# Patient Record
Sex: FEMALE | Race: WHITE | NOT HISPANIC OR LATINO | URBAN - METROPOLITAN AREA
[De-identification: names, ages, dates, MRNs, and addresses within clinical notes are randomized per-mention and may not be internally consistent; named-entity substitution may affect disease eponyms.]

---

## 2017-03-10 ENCOUNTER — RECORDS - HEALTHEAST (OUTPATIENT)
Dept: LAB | Facility: CLINIC | Age: 22
End: 2017-03-10

## 2017-03-10 LAB — HIV 1+2 AB+HIV1 P24 AG SERPL QL IA: NEGATIVE

## 2017-03-13 LAB — SYPHILIS RPR SCREEN - HISTORICAL: NORMAL

## 2018-02-27 ENCOUNTER — RECORDS - HEALTHEAST (OUTPATIENT)
Dept: LAB | Facility: CLINIC | Age: 23
End: 2018-02-27

## 2018-02-27 LAB
BASOPHILS # BLD AUTO: 0 THOU/UL (ref 0–0.2)
BASOPHILS NFR BLD AUTO: 1 % (ref 0–2)
EOSINOPHIL # BLD AUTO: 0.1 THOU/UL (ref 0–0.4)
EOSINOPHIL NFR BLD AUTO: 2 % (ref 0–6)
ERYTHROCYTE [DISTWIDTH] IN BLOOD BY AUTOMATED COUNT: 12.5 % (ref 11–14.5)
HCT VFR BLD AUTO: 34.9 % (ref 35–47)
HGB BLD-MCNC: 11.6 G/DL (ref 12–16)
HIV 1+2 AB+HIV1 P24 AG SERPL QL IA: NEGATIVE
LYMPHOCYTES # BLD AUTO: 2.1 THOU/UL (ref 0.8–4.4)
LYMPHOCYTES NFR BLD AUTO: 33 % (ref 20–40)
MCH RBC QN AUTO: 29.6 PG (ref 27–34)
MCHC RBC AUTO-ENTMCNC: 33.2 G/DL (ref 32–36)
MCV RBC AUTO: 89 FL (ref 80–100)
MONOCYTES # BLD AUTO: 0.4 THOU/UL (ref 0–0.9)
MONOCYTES NFR BLD AUTO: 7 % (ref 2–10)
NEUTROPHILS # BLD AUTO: 3.7 THOU/UL (ref 2–7.7)
NEUTROPHILS NFR BLD AUTO: 58 % (ref 50–70)
PLATELET # BLD AUTO: 274 THOU/UL (ref 140–440)
PMV BLD AUTO: 9.9 FL (ref 8.5–12.5)
RBC # BLD AUTO: 3.92 MILL/UL (ref 3.8–5.4)
WBC: 6.3 THOU/UL (ref 4–11)

## 2018-02-28 LAB
ABO/RH(D): NORMAL
ABORH REPEAT: NORMAL
ANTIBODY SCREEN: NEGATIVE
HBV SURFACE AG SERPL QL IA: NEGATIVE
RUBV IGG SERPL QL IA: POSITIVE
SYPHILIS RPR SCREEN - HISTORICAL: NORMAL

## 2018-03-13 ENCOUNTER — RECORDS - HEALTHEAST (OUTPATIENT)
Dept: LAB | Facility: CLINIC | Age: 23
End: 2018-03-13

## 2018-03-14 LAB
C TRACH DNA SPEC QL PROBE+SIG AMP: NEGATIVE
N GONORRHOEA DNA SPEC QL NAA+PROBE: NEGATIVE

## 2018-03-15 LAB

## 2018-04-10 ENCOUNTER — RECORDS - HEALTHEAST (OUTPATIENT)
Dept: LAB | Facility: CLINIC | Age: 23
End: 2018-04-10

## 2018-04-12 LAB
AFP MOM: 1.38 MOM
ALPHA FETO PROTEIN: 43.9 NG/ML
CALCULATED AGE AT EDD: NORMAL
COLLECTION DATE: NORMAL
CURRENT CIGARETTE SMOKING STATUS: NORMAL
DOWN SYNDROME MATERNAL AGE RISK: NORMAL
DOWN SYNDROME SCREEN RISK ESTIMATE: NORMAL
EDD BY LMP: NORMAL
GA ON COLLECTION BY DATES: NORMAL WK,D
GA USED IN RISK ESTIMATE: NORMAL
GENERAL TEST INFORMATION: NORMAL
HCG, TOTAL MOM: 1.26 MOM
HCG, TOTAL: 40.9 IU/ML
INHIBIN MOM: 1.03 MOM
INHIBIN: 152 PG/ML
INITIAL OR REPEAT TESTING: NORMAL
INSULIN DIABETIC: NO
INTERPRETATION: NORMAL
IVF PREGNANCY: NO
Lab: NORMAL
NEURAL TUBE DEFECT RISK ESTIMATE: NORMAL
NUMBER OF CHORIONS: NORMAL
NUMBER OF FETUSES:: 1
PATIENT OR FATHER OF BABY HAS A NTD: NORMAL
PATIENT WEIGHT: 170 LBS
PHYSICIAN PHONE NUMBER: NORMAL
PREV DOWN (T21) / TRISOMY PREGNANCY: NO
PREV PREGNANCY W/ NEURAL TUBE DEFECT: NORMAL
PT DATE OF BIRTH: NORMAL
RACE OF MOTHER: NORMAL
RECOMMENDED FOLLOW UP: NORMAL
TRISOMY 18 SCREEN RISK ESTIMATE: NORMAL
UE3 MOM: 0.96 MOM
UE3: 0.81 NG/ML

## 2018-05-31 ENCOUNTER — HOSPITAL ENCOUNTER (OUTPATIENT)
Dept: MEDSURG UNIT | Facility: CLINIC | Age: 23
Discharge: HOME | End: 2018-05-31
Attending: FAMILY MEDICINE | Admitting: FAMILY MEDICINE

## 2018-05-31 LAB — FETAL RBC % LFV: 0 %

## 2018-07-05 ENCOUNTER — RECORDS - HEALTHEAST (OUTPATIENT)
Dept: LAB | Facility: CLINIC | Age: 23
End: 2018-07-05

## 2018-07-05 LAB — T PALLIDUM AB SER QL: NEGATIVE

## 2018-08-27 ENCOUNTER — RECORDS - HEALTHEAST (OUTPATIENT)
Dept: LAB | Facility: CLINIC | Age: 23
End: 2018-08-27

## 2018-08-29 LAB
ALLERGIC TO PENICILLIN: NO
GP B STREP DNA SPEC QL NAA+PROBE: POSITIVE

## 2018-09-28 ENCOUNTER — HOME CARE/HOSPICE - HEALTHEAST (OUTPATIENT)
Dept: HOME HEALTH SERVICES | Facility: HOME HEALTH | Age: 23
End: 2018-09-28

## 2018-09-29 ENCOUNTER — HOME CARE/HOSPICE - HEALTHEAST (OUTPATIENT)
Dept: HOME HEALTH SERVICES | Facility: HOME HEALTH | Age: 23
End: 2018-09-29

## 2019-12-17 ENCOUNTER — RECORDS - HEALTHEAST (OUTPATIENT)
Dept: LAB | Facility: CLINIC | Age: 24
End: 2019-12-17

## 2019-12-18 LAB — TSH SERPL DL<=0.005 MIU/L-ACNC: 1.72 UIU/ML (ref 0.3–5)

## 2019-12-19 LAB
C TRACH DNA SPEC QL PROBE+SIG AMP: NEGATIVE
N GONORRHOEA DNA SPEC QL NAA+PROBE: NEGATIVE

## 2021-03-24 ENCOUNTER — RECORDS - HEALTHEAST (OUTPATIENT)
Dept: LAB | Facility: CLINIC | Age: 26
End: 2021-03-24

## 2021-03-24 LAB — HIV 1+2 AB+HIV1 P24 AG SERPL QL IA: NEGATIVE

## 2021-03-25 LAB
BACTERIA SPEC CULT: ABNORMAL
C TRACH DNA SPEC QL PROBE+SIG AMP: NEGATIVE
N GONORRHOEA DNA SPEC QL NAA+PROBE: NEGATIVE
T PALLIDUM AB SER QL: NEGATIVE

## 2021-03-26 LAB
HSV1 IGG SERPL QL IA: POSITIVE
HSV2 IGG SERPL QL IA: NEGATIVE

## 2021-05-02 ENCOUNTER — HEALTH MAINTENANCE LETTER (OUTPATIENT)
Age: 26
End: 2021-05-02
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
## 2021-07-14 PROBLEM — Z34.90 PREGNANT: Status: RESOLVED | Noted: 2018-09-26 | Resolved: 2018-09-28

## 2021-07-14 PROBLEM — O20.9 BLEEDING IN EARLY PREGNANCY: Status: RESOLVED | Noted: 2018-02-05 | Resolved: 2018-09-28

## 2021-10-16 ENCOUNTER — HEALTH MAINTENANCE LETTER (OUTPATIENT)
Age: 26
End: 2021-10-16

## 2022-05-28 ENCOUNTER — HEALTH MAINTENANCE LETTER (OUTPATIENT)
Age: 27
End: 2022-05-28

## 2022-09-16 ENCOUNTER — LAB REQUISITION (OUTPATIENT)
Dept: LAB | Facility: CLINIC | Age: 27
End: 2022-09-16

## 2022-09-16 DIAGNOSIS — Z32.01 ENCOUNTER FOR PREGNANCY TEST, RESULT POSITIVE: ICD-10-CM

## 2022-09-16 LAB
BASOPHILS # BLD AUTO: 0.1 10E3/UL (ref 0–0.2)
BASOPHILS NFR BLD AUTO: 1 %
EOSINOPHIL # BLD AUTO: 0.3 10E3/UL (ref 0–0.7)
EOSINOPHIL NFR BLD AUTO: 4 %
ERYTHROCYTE [DISTWIDTH] IN BLOOD BY AUTOMATED COUNT: 12.9 % (ref 10–15)
HCT VFR BLD AUTO: 35 % (ref 35–47)
HGB BLD-MCNC: 11.4 G/DL (ref 11.7–15.7)
IMM GRANULOCYTES # BLD: 0 10E3/UL
IMM GRANULOCYTES NFR BLD: 0 %
LYMPHOCYTES # BLD AUTO: 2 10E3/UL (ref 0.8–5.3)
LYMPHOCYTES NFR BLD AUTO: 28 %
MCH RBC QN AUTO: 29.2 PG (ref 26.5–33)
MCHC RBC AUTO-ENTMCNC: 32.6 G/DL (ref 31.5–36.5)
MCV RBC AUTO: 90 FL (ref 78–100)
MONOCYTES # BLD AUTO: 0.7 10E3/UL (ref 0–1.3)
MONOCYTES NFR BLD AUTO: 10 %
NEUTROPHILS # BLD AUTO: 3.9 10E3/UL (ref 1.6–8.3)
NEUTROPHILS NFR BLD AUTO: 57 %
NRBC # BLD AUTO: 0 10E3/UL
NRBC BLD AUTO-RTO: 0 /100
PLATELET # BLD AUTO: 316 10E3/UL (ref 150–450)
RBC # BLD AUTO: 3.91 10E6/UL (ref 3.8–5.2)
WBC # BLD AUTO: 6.9 10E3/UL (ref 4–11)

## 2022-09-16 PROCEDURE — 85025 COMPLETE CBC W/AUTO DIFF WBC: CPT | Performed by: PHYSICIAN ASSISTANT

## 2022-09-16 PROCEDURE — 87491 CHLMYD TRACH DNA AMP PROBE: CPT | Performed by: PHYSICIAN ASSISTANT

## 2022-09-16 PROCEDURE — 86803 HEPATITIS C AB TEST: CPT | Performed by: PHYSICIAN ASSISTANT

## 2022-09-16 PROCEDURE — 86850 RBC ANTIBODY SCREEN: CPT | Performed by: PHYSICIAN ASSISTANT

## 2022-09-16 PROCEDURE — 87086 URINE CULTURE/COLONY COUNT: CPT | Performed by: PHYSICIAN ASSISTANT

## 2022-09-16 PROCEDURE — 87340 HEPATITIS B SURFACE AG IA: CPT | Performed by: PHYSICIAN ASSISTANT

## 2022-09-16 PROCEDURE — 87389 HIV-1 AG W/HIV-1&-2 AB AG IA: CPT | Performed by: PHYSICIAN ASSISTANT

## 2022-09-16 PROCEDURE — 86901 BLOOD TYPING SEROLOGIC RH(D): CPT | Performed by: PHYSICIAN ASSISTANT

## 2022-09-16 PROCEDURE — 86780 TREPONEMA PALLIDUM: CPT | Performed by: PHYSICIAN ASSISTANT

## 2022-09-16 PROCEDURE — 86762 RUBELLA ANTIBODY: CPT | Performed by: PHYSICIAN ASSISTANT

## 2022-09-17 LAB
ABO/RH(D): NORMAL
ANTIBODY SCREEN: NEGATIVE
C TRACH DNA SPEC QL PROBE+SIG AMP: NEGATIVE
N GONORRHOEA DNA SPEC QL NAA+PROBE: NEGATIVE
SPECIMEN EXPIRATION DATE: NORMAL
T PALLIDUM AB SER QL: NONREACTIVE

## 2022-09-18 LAB — BACTERIA UR CULT: NORMAL

## 2022-09-19 LAB
HBV SURFACE AG SERPL QL IA: NONREACTIVE
HCV AB SERPL QL IA: NONREACTIVE
HIV 1+2 AB+HIV1 P24 AG SERPL QL IA: NONREACTIVE
RUBV IGG SERPL QL IA: 1.41 INDEX
RUBV IGG SERPL QL IA: POSITIVE

## 2022-10-01 ENCOUNTER — HEALTH MAINTENANCE LETTER (OUTPATIENT)
Age: 27
End: 2022-10-01

## 2022-10-21 ENCOUNTER — LAB REQUISITION (OUTPATIENT)
Dept: LAB | Facility: CLINIC | Age: 27
End: 2022-10-21

## 2022-10-21 DIAGNOSIS — Z34.81 ENCOUNTER FOR SUPERVISION OF OTHER NORMAL PREGNANCY, FIRST TRIMESTER: ICD-10-CM

## 2022-10-21 PROCEDURE — G0145 SCR C/V CYTO,THINLAYER,RESCR: HCPCS | Performed by: FAMILY MEDICINE

## 2022-10-26 LAB
BKR LAB AP GYN ADEQUACY: NORMAL
BKR LAB AP GYN INTERPRETATION: NORMAL
BKR LAB AP HPV REFLEX: NO
BKR LAB AP LMP: NORMAL
BKR LAB AP PREVIOUS ABNORMAL: NORMAL
PATH REPORT.COMMENTS IMP SPEC: NORMAL
PATH REPORT.COMMENTS IMP SPEC: NORMAL
PATH REPORT.RELEVANT HX SPEC: NORMAL

## 2022-11-11 ENCOUNTER — LAB REQUISITION (OUTPATIENT)
Dept: LAB | Facility: CLINIC | Age: 27
End: 2022-11-11
Payer: COMMERCIAL

## 2022-11-11 DIAGNOSIS — Z36.9 ENCOUNTER FOR ANTENATAL SCREENING, UNSPECIFIED: ICD-10-CM

## 2022-11-11 PROCEDURE — 81508 FTL CGEN ABNOR TWO PROTEINS: CPT | Mod: ORL | Performed by: OBSTETRICS & GYNECOLOGY

## 2022-11-15 LAB
# FETUSES US: NORMAL
AGE - REPORTED: 29.3 YR
CURRENT SMOKER: NO
FET CRL US.MEAS: 76.8 MM
FET CRL US.MEAS: NORMAL MM
FET NUCHAL FOLD MOM THICKNESS US.MEAS: 1.06
FET NUCHAL FOLD MOM THICKNESS US.MEAS: NORMAL
FET NUCHAL FOLD THICKNESS US.MEAS: 2 MM
FET NUCHAL FOLD THICKNESS US.MEAS: NORMAL MM
GA: NORMAL WK
HCG MOM SERPL: 1.06
HCG SERPL-ACNC: NORMAL IU/L
HX OF HEREDITARY DISORDERS: NO
INTEGRATED SCN PATIENT-IMP: NORMAL
PAPP-A MOM SERPL: 0.67
PAPP-A SERPL-MCNC: 761.7 NG/ML
PATHOLOGY STUDY: NORMAL
SONOGRAPHER NAME: NORMAL
SONOGRAPHER: NORMAL
SPECIMEN DRAWN SERPL: NORMAL
US DATE: NORMAL

## 2023-02-10 ENCOUNTER — LAB REQUISITION (OUTPATIENT)
Dept: LAB | Facility: CLINIC | Age: 28
End: 2023-02-10

## 2023-02-10 DIAGNOSIS — Z34.82 ENCOUNTER FOR SUPERVISION OF OTHER NORMAL PREGNANCY, SECOND TRIMESTER: ICD-10-CM

## 2023-02-10 PROCEDURE — 86780 TREPONEMA PALLIDUM: CPT | Performed by: FAMILY MEDICINE

## 2023-02-11 LAB — T PALLIDUM AB SER QL: NONREACTIVE

## 2023-03-03 ENCOUNTER — LAB REQUISITION (OUTPATIENT)
Dept: LAB | Facility: CLINIC | Age: 28
End: 2023-03-03

## 2023-03-03 DIAGNOSIS — Z34.82 ENCOUNTER FOR SUPERVISION OF OTHER NORMAL PREGNANCY, SECOND TRIMESTER: ICD-10-CM

## 2023-03-03 LAB
ALBUMIN SERPL BCG-MCNC: 4.1 G/DL (ref 3.5–5.2)
ALP SERPL-CCNC: 49 U/L (ref 35–104)
ALT SERPL W P-5'-P-CCNC: 7 U/L (ref 10–35)
ANION GAP SERPL CALCULATED.3IONS-SCNC: 13 MMOL/L (ref 7–15)
AST SERPL W P-5'-P-CCNC: 11 U/L (ref 10–35)
BILIRUB SERPL-MCNC: 0.5 MG/DL
BUN SERPL-MCNC: 4 MG/DL (ref 6–20)
CALCIUM SERPL-MCNC: 9.3 MG/DL (ref 8.6–10)
CHLORIDE SERPL-SCNC: 104 MMOL/L (ref 98–107)
CREAT SERPL-MCNC: 0.42 MG/DL (ref 0.51–0.95)
DEPRECATED HCO3 PLAS-SCNC: 17 MMOL/L (ref 22–29)
GFR SERPL CREATININE-BSD FRML MDRD: >90 ML/MIN/1.73M2
GLUCOSE SERPL-MCNC: 77 MG/DL (ref 70–99)
POTASSIUM SERPL-SCNC: 3.9 MMOL/L (ref 3.4–5.3)
PROT SERPL-MCNC: 6.4 G/DL (ref 6.4–8.3)
SODIUM SERPL-SCNC: 134 MMOL/L (ref 136–145)

## 2023-03-03 PROCEDURE — 80053 COMPREHEN METABOLIC PANEL: CPT | Performed by: FAMILY MEDICINE

## 2023-03-17 ENCOUNTER — LAB REQUISITION (OUTPATIENT)
Dept: LAB | Facility: CLINIC | Age: 28
End: 2023-03-17

## 2023-03-17 DIAGNOSIS — Z34.83 ENCOUNTER FOR SUPERVISION OF OTHER NORMAL PREGNANCY, THIRD TRIMESTER: ICD-10-CM

## 2023-03-17 PROCEDURE — 87591 N.GONORRHOEAE DNA AMP PROB: CPT | Performed by: FAMILY MEDICINE

## 2023-03-17 PROCEDURE — 87491 CHLMYD TRACH DNA AMP PROBE: CPT | Performed by: FAMILY MEDICINE

## 2023-03-18 LAB
C TRACH DNA SPEC QL PROBE+SIG AMP: NEGATIVE
N GONORRHOEA DNA SPEC QL NAA+PROBE: NEGATIVE

## 2023-04-25 ENCOUNTER — LAB REQUISITION (OUTPATIENT)
Dept: LAB | Facility: CLINIC | Age: 28
End: 2023-04-25

## 2023-04-25 DIAGNOSIS — Z34.83 ENCOUNTER FOR SUPERVISION OF OTHER NORMAL PREGNANCY, THIRD TRIMESTER: ICD-10-CM

## 2023-04-25 PROCEDURE — 87653 STREP B DNA AMP PROBE: CPT | Performed by: FAMILY MEDICINE

## 2023-04-27 LAB — GP B STREP DNA SPEC QL NAA+PROBE: POSITIVE

## 2023-05-01 ENCOUNTER — TRANSFERRED RECORDS (OUTPATIENT)
Dept: HEALTH INFORMATION MANAGEMENT | Facility: CLINIC | Age: 28
End: 2023-05-01
Payer: COMMERCIAL

## 2023-05-04 ENCOUNTER — TRANSFERRED RECORDS (OUTPATIENT)
Dept: HEALTH INFORMATION MANAGEMENT | Facility: CLINIC | Age: 28
End: 2023-05-04
Payer: COMMERCIAL

## 2023-05-10 ENCOUNTER — HOSPITAL ENCOUNTER (INPATIENT)
Facility: CLINIC | Age: 28
LOS: 2 days | Discharge: HOME | End: 2023-05-12
Attending: FAMILY MEDICINE | Admitting: FAMILY MEDICINE
Payer: COMMERCIAL

## 2023-05-10 PROBLEM — Z34.90 PREGNANCY: Status: ACTIVE | Noted: 2023-05-10

## 2023-05-10 PROCEDURE — 3E0P7VZ INTRODUCTION OF HORMONE INTO FEMALE REPRODUCTIVE, VIA NATURAL OR ARTIFICIAL OPENING: ICD-10-PCS | Performed by: FAMILY MEDICINE

## 2023-05-10 PROCEDURE — 250N000013 HC RX MED GY IP 250 OP 250 PS 637: Performed by: FAMILY MEDICINE

## 2023-05-10 PROCEDURE — 120N000001 HC R&B MED SURG/OB

## 2023-05-10 RX ORDER — OXYTOCIN 10 [USP'U]/ML
10 INJECTION, SOLUTION INTRAMUSCULAR; INTRAVENOUS
Status: DISCONTINUED | OUTPATIENT
Start: 2023-05-10 | End: 2023-05-11 | Stop reason: HOSPADM

## 2023-05-10 RX ORDER — NALOXONE HYDROCHLORIDE 0.4 MG/ML
0.2 INJECTION, SOLUTION INTRAMUSCULAR; INTRAVENOUS; SUBCUTANEOUS
Status: DISCONTINUED | OUTPATIENT
Start: 2023-05-10 | End: 2023-05-11 | Stop reason: HOSPADM

## 2023-05-10 RX ORDER — MISOPROSTOL 200 UG/1
800 TABLET ORAL
Status: DISCONTINUED | OUTPATIENT
Start: 2023-05-10 | End: 2023-05-11 | Stop reason: HOSPADM

## 2023-05-10 RX ORDER — NALOXONE HYDROCHLORIDE 0.4 MG/ML
0.4 INJECTION, SOLUTION INTRAMUSCULAR; INTRAVENOUS; SUBCUTANEOUS
Status: DISCONTINUED | OUTPATIENT
Start: 2023-05-10 | End: 2023-05-11 | Stop reason: HOSPADM

## 2023-05-10 RX ORDER — MISOPROSTOL 200 UG/1
400 TABLET ORAL
Status: DISCONTINUED | OUTPATIENT
Start: 2023-05-10 | End: 2023-05-11 | Stop reason: HOSPADM

## 2023-05-10 RX ORDER — KETOROLAC TROMETHAMINE 30 MG/ML
30 INJECTION, SOLUTION INTRAMUSCULAR; INTRAVENOUS
Status: DISCONTINUED | OUTPATIENT
Start: 2023-05-10 | End: 2023-05-12 | Stop reason: HOSPADM

## 2023-05-10 RX ORDER — SODIUM CHLORIDE, SODIUM LACTATE, POTASSIUM CHLORIDE, CALCIUM CHLORIDE 600; 310; 30; 20 MG/100ML; MG/100ML; MG/100ML; MG/100ML
INJECTION, SOLUTION INTRAVENOUS CONTINUOUS
Status: DISCONTINUED | OUTPATIENT
Start: 2023-05-10 | End: 2023-05-11 | Stop reason: HOSPADM

## 2023-05-10 RX ORDER — OXYTOCIN/0.9 % SODIUM CHLORIDE 30/500 ML
100-340 PLASTIC BAG, INJECTION (ML) INTRAVENOUS CONTINUOUS PRN
Status: DISCONTINUED | OUTPATIENT
Start: 2023-05-10 | End: 2023-05-12 | Stop reason: HOSPADM

## 2023-05-10 RX ORDER — METOCLOPRAMIDE HYDROCHLORIDE 5 MG/ML
10 INJECTION INTRAMUSCULAR; INTRAVENOUS EVERY 6 HOURS PRN
Status: DISCONTINUED | OUTPATIENT
Start: 2023-05-10 | End: 2023-05-11 | Stop reason: HOSPADM

## 2023-05-10 RX ORDER — OXYTOCIN 10 [USP'U]/ML
10 INJECTION, SOLUTION INTRAMUSCULAR; INTRAVENOUS
Status: DISCONTINUED | OUTPATIENT
Start: 2023-05-10 | End: 2023-05-12 | Stop reason: HOSPADM

## 2023-05-10 RX ORDER — CITRIC ACID/SODIUM CITRATE 334-500MG
30 SOLUTION, ORAL ORAL
Status: DISCONTINUED | OUTPATIENT
Start: 2023-05-10 | End: 2023-05-11 | Stop reason: HOSPADM

## 2023-05-10 RX ORDER — IBUPROFEN 800 MG/1
800 TABLET, FILM COATED ORAL
Status: DISCONTINUED | OUTPATIENT
Start: 2023-05-10 | End: 2023-05-12 | Stop reason: HOSPADM

## 2023-05-10 RX ORDER — CARBOPROST TROMETHAMINE 250 UG/ML
250 INJECTION, SOLUTION INTRAMUSCULAR
Status: DISCONTINUED | OUTPATIENT
Start: 2023-05-10 | End: 2023-05-11 | Stop reason: HOSPADM

## 2023-05-10 RX ORDER — PENICILLIN G POTASSIUM 5000000 [IU]/1
5 INJECTION, POWDER, FOR SOLUTION INTRAMUSCULAR; INTRAVENOUS ONCE
Status: COMPLETED | OUTPATIENT
Start: 2023-05-10 | End: 2023-05-11

## 2023-05-10 RX ORDER — PROCHLORPERAZINE 25 MG
25 SUPPOSITORY, RECTAL RECTAL EVERY 12 HOURS PRN
Status: DISCONTINUED | OUTPATIENT
Start: 2023-05-10 | End: 2023-05-11 | Stop reason: HOSPADM

## 2023-05-10 RX ORDER — PENICILLIN G 3000000 [IU]/50ML
3 INJECTION, SOLUTION INTRAVENOUS EVERY 4 HOURS
Status: DISCONTINUED | OUTPATIENT
Start: 2023-05-11 | End: 2023-05-11 | Stop reason: HOSPADM

## 2023-05-10 RX ORDER — ONDANSETRON 2 MG/ML
4 INJECTION INTRAMUSCULAR; INTRAVENOUS EVERY 6 HOURS PRN
Status: DISCONTINUED | OUTPATIENT
Start: 2023-05-10 | End: 2023-05-11 | Stop reason: HOSPADM

## 2023-05-10 RX ORDER — PROCHLORPERAZINE MALEATE 10 MG
10 TABLET ORAL EVERY 6 HOURS PRN
Status: DISCONTINUED | OUTPATIENT
Start: 2023-05-10 | End: 2023-05-11 | Stop reason: HOSPADM

## 2023-05-10 RX ORDER — FENTANYL CITRATE 50 UG/ML
100 INJECTION, SOLUTION INTRAMUSCULAR; INTRAVENOUS
Status: DISCONTINUED | OUTPATIENT
Start: 2023-05-10 | End: 2023-05-11 | Stop reason: HOSPADM

## 2023-05-10 RX ORDER — METOCLOPRAMIDE 10 MG/1
10 TABLET ORAL EVERY 6 HOURS PRN
Status: DISCONTINUED | OUTPATIENT
Start: 2023-05-10 | End: 2023-05-11 | Stop reason: HOSPADM

## 2023-05-10 RX ORDER — ACETAMINOPHEN 325 MG/1
650 TABLET ORAL EVERY 4 HOURS PRN
Status: DISCONTINUED | OUTPATIENT
Start: 2023-05-10 | End: 2023-05-11 | Stop reason: HOSPADM

## 2023-05-10 RX ORDER — MORPHINE SULFATE 10 MG/ML
10 INJECTION, SOLUTION INTRAMUSCULAR; INTRAVENOUS
Status: DISCONTINUED | OUTPATIENT
Start: 2023-05-10 | End: 2023-05-11 | Stop reason: HOSPADM

## 2023-05-10 RX ORDER — ONDANSETRON 4 MG/1
4 TABLET, ORALLY DISINTEGRATING ORAL EVERY 6 HOURS PRN
Status: DISCONTINUED | OUTPATIENT
Start: 2023-05-10 | End: 2023-05-11 | Stop reason: HOSPADM

## 2023-05-10 RX ORDER — METHYLERGONOVINE MALEATE 0.2 MG/ML
200 INJECTION INTRAVENOUS
Status: DISCONTINUED | OUTPATIENT
Start: 2023-05-10 | End: 2023-05-11 | Stop reason: HOSPADM

## 2023-05-10 RX ORDER — FERROUS SULFATE 325(65) MG
325 TABLET ORAL DAILY
Status: DISCONTINUED | OUTPATIENT
Start: 2023-05-10 | End: 2023-05-12 | Stop reason: HOSPADM

## 2023-05-10 RX ORDER — HYDROXYZINE HYDROCHLORIDE 25 MG/1
50-100 TABLET, FILM COATED ORAL
Status: DISCONTINUED | OUTPATIENT
Start: 2023-05-10 | End: 2023-05-11 | Stop reason: HOSPADM

## 2023-05-10 RX ORDER — OXYTOCIN/0.9 % SODIUM CHLORIDE 30/500 ML
340 PLASTIC BAG, INJECTION (ML) INTRAVENOUS CONTINUOUS PRN
Status: DISCONTINUED | OUTPATIENT
Start: 2023-05-10 | End: 2023-05-11 | Stop reason: HOSPADM

## 2023-05-10 RX ORDER — TERBUTALINE SULFATE 1 MG/ML
0.25 INJECTION, SOLUTION SUBCUTANEOUS
Status: DISCONTINUED | OUTPATIENT
Start: 2023-05-10 | End: 2023-05-11 | Stop reason: HOSPADM

## 2023-05-10 RX ADMIN — FERROUS SULFATE TAB 325 MG (65 MG ELEMENTAL FE) 325 MG: 325 (65 FE) TAB at 21:52

## 2023-05-10 RX ADMIN — DINOPROSTONE 10 MG: 10 INSERT VAGINAL at 21:35

## 2023-05-10 ASSESSMENT — ACTIVITIES OF DAILY LIVING (ADL)
DIFFICULTY_EATING/SWALLOWING: NO
TOILETING_ISSUES: NO
WALKING_OR_CLIMBING_STAIRS_DIFFICULTY: NO
CONCENTRATING,_REMEMBERING_OR_MAKING_DECISIONS_DIFFICULTY: NO
WEAR_GLASSES_OR_BLIND: NO
DOING_ERRANDS_INDEPENDENTLY_DIFFICULTY: NO
DRESSING/BATHING_DIFFICULTY: NO
ADLS_ACUITY_SCORE: 18
ADLS_ACUITY_SCORE: 18
FALL_HISTORY_WITHIN_LAST_SIX_MONTHS: NO
CHANGE_IN_FUNCTIONAL_STATUS_SINCE_ONSET_OF_CURRENT_ILLNESS/INJURY: NO

## 2023-05-11 LAB
ABO/RH(D): NORMAL
ANTIBODY SCREEN: NEGATIVE
ERYTHROCYTE [DISTWIDTH] IN BLOOD BY AUTOMATED COUNT: 13.9 % (ref 10–15)
HCT VFR BLD AUTO: 28 % (ref 35–47)
HGB BLD-MCNC: 9.4 G/DL (ref 11.7–15.7)
MCH RBC QN AUTO: 31.5 PG (ref 26.5–33)
MCHC RBC AUTO-ENTMCNC: 33.6 G/DL (ref 31.5–36.5)
MCV RBC AUTO: 94 FL (ref 78–100)
PLATELET # BLD AUTO: 327 10E3/UL (ref 150–450)
RBC # BLD AUTO: 2.98 10E6/UL (ref 3.8–5.2)
SPECIMEN EXPIRATION DATE: NORMAL
WBC # BLD AUTO: 9.3 10E3/UL (ref 4–11)

## 2023-05-11 PROCEDURE — 258N000003 HC RX IP 258 OP 636: Performed by: FAMILY MEDICINE

## 2023-05-11 PROCEDURE — 999N000157 HC STATISTIC RCP TIME EA 10 MIN

## 2023-05-11 PROCEDURE — 0HQ9XZZ REPAIR PERINEUM SKIN, EXTERNAL APPROACH: ICD-10-PCS | Performed by: OBSTETRICS & GYNECOLOGY

## 2023-05-11 PROCEDURE — 250N000009 HC RX 250: Performed by: STUDENT IN AN ORGANIZED HEALTH CARE EDUCATION/TRAINING PROGRAM

## 2023-05-11 PROCEDURE — 85027 COMPLETE CBC AUTOMATED: CPT | Performed by: FAMILY MEDICINE

## 2023-05-11 PROCEDURE — 999N000016 HC STATISTIC ATTENDANCE AT DELIVERY

## 2023-05-11 PROCEDURE — 250N000011 HC RX IP 250 OP 636: Performed by: FAMILY MEDICINE

## 2023-05-11 PROCEDURE — 86901 BLOOD TYPING SEROLOGIC RH(D): CPT | Performed by: FAMILY MEDICINE

## 2023-05-11 PROCEDURE — 250N000013 HC RX MED GY IP 250 OP 250 PS 637: Performed by: FAMILY MEDICINE

## 2023-05-11 PROCEDURE — 86850 RBC ANTIBODY SCREEN: CPT | Performed by: FAMILY MEDICINE

## 2023-05-11 PROCEDURE — 722N000001 HC LABOR CARE VAGINAL DELIVERY SINGLE

## 2023-05-11 PROCEDURE — 120N000001 HC R&B MED SURG/OB

## 2023-05-11 RX ORDER — MISOPROSTOL 200 UG/1
800 TABLET ORAL
Status: DISCONTINUED | OUTPATIENT
Start: 2023-05-11 | End: 2023-05-12 | Stop reason: HOSPADM

## 2023-05-11 RX ORDER — HYDROCORTISONE 25 MG/G
CREAM TOPICAL 3 TIMES DAILY PRN
Status: DISCONTINUED | OUTPATIENT
Start: 2023-05-11 | End: 2023-05-12 | Stop reason: HOSPADM

## 2023-05-11 RX ORDER — SODIUM CHLORIDE, SODIUM LACTATE, POTASSIUM CHLORIDE, CALCIUM CHLORIDE 600; 310; 30; 20 MG/100ML; MG/100ML; MG/100ML; MG/100ML
INJECTION, SOLUTION INTRAVENOUS CONTINUOUS PRN
Status: DISCONTINUED | OUTPATIENT
Start: 2023-05-11 | End: 2023-05-11 | Stop reason: HOSPADM

## 2023-05-11 RX ORDER — IBUPROFEN 800 MG/1
800 TABLET, FILM COATED ORAL EVERY 6 HOURS PRN
Status: DISCONTINUED | OUTPATIENT
Start: 2023-05-11 | End: 2023-05-12 | Stop reason: HOSPADM

## 2023-05-11 RX ORDER — LIDOCAINE 40 MG/G
CREAM TOPICAL
Status: DISCONTINUED | OUTPATIENT
Start: 2023-05-11 | End: 2023-05-11 | Stop reason: HOSPADM

## 2023-05-11 RX ORDER — METHYLERGONOVINE MALEATE 0.2 MG/ML
200 INJECTION INTRAVENOUS
Status: DISCONTINUED | OUTPATIENT
Start: 2023-05-11 | End: 2023-05-12 | Stop reason: HOSPADM

## 2023-05-11 RX ORDER — MISOPROSTOL 200 UG/1
400 TABLET ORAL
Status: DISCONTINUED | OUTPATIENT
Start: 2023-05-11 | End: 2023-05-12 | Stop reason: HOSPADM

## 2023-05-11 RX ORDER — MISOPROSTOL 100 UG/1
25 TABLET ORAL
Status: DISCONTINUED | OUTPATIENT
Start: 2023-05-11 | End: 2023-05-11

## 2023-05-11 RX ORDER — BISACODYL 10 MG
10 SUPPOSITORY, RECTAL RECTAL DAILY PRN
Status: DISCONTINUED | OUTPATIENT
Start: 2023-05-11 | End: 2023-05-12 | Stop reason: HOSPADM

## 2023-05-11 RX ORDER — OXYTOCIN 10 [USP'U]/ML
10 INJECTION, SOLUTION INTRAMUSCULAR; INTRAVENOUS
Status: DISCONTINUED | OUTPATIENT
Start: 2023-05-11 | End: 2023-05-12 | Stop reason: HOSPADM

## 2023-05-11 RX ORDER — MODIFIED LANOLIN
OINTMENT (GRAM) TOPICAL
Status: DISCONTINUED | OUTPATIENT
Start: 2023-05-11 | End: 2023-05-12 | Stop reason: HOSPADM

## 2023-05-11 RX ORDER — ACETAMINOPHEN 325 MG/1
650 TABLET ORAL EVERY 4 HOURS PRN
Status: DISCONTINUED | OUTPATIENT
Start: 2023-05-11 | End: 2023-05-12 | Stop reason: HOSPADM

## 2023-05-11 RX ORDER — OXYTOCIN/0.9 % SODIUM CHLORIDE 30/500 ML
340 PLASTIC BAG, INJECTION (ML) INTRAVENOUS CONTINUOUS PRN
Status: DISCONTINUED | OUTPATIENT
Start: 2023-05-11 | End: 2023-05-12 | Stop reason: HOSPADM

## 2023-05-11 RX ORDER — TERBUTALINE SULFATE 1 MG/ML
0.25 INJECTION, SOLUTION SUBCUTANEOUS
Status: DISCONTINUED | OUTPATIENT
Start: 2023-05-11 | End: 2023-05-11 | Stop reason: HOSPADM

## 2023-05-11 RX ORDER — DOCUSATE SODIUM 100 MG/1
100 CAPSULE, LIQUID FILLED ORAL DAILY
Status: DISCONTINUED | OUTPATIENT
Start: 2023-05-11 | End: 2023-05-12 | Stop reason: HOSPADM

## 2023-05-11 RX ORDER — CARBOPROST TROMETHAMINE 250 UG/ML
250 INJECTION, SOLUTION INTRAMUSCULAR
Status: DISCONTINUED | OUTPATIENT
Start: 2023-05-11 | End: 2023-05-12 | Stop reason: HOSPADM

## 2023-05-11 RX ORDER — OXYTOCIN/0.9 % SODIUM CHLORIDE 30/500 ML
1-24 PLASTIC BAG, INJECTION (ML) INTRAVENOUS CONTINUOUS
Status: DISCONTINUED | OUTPATIENT
Start: 2023-05-11 | End: 2023-05-11 | Stop reason: HOSPADM

## 2023-05-11 RX ADMIN — SODIUM CHLORIDE, POTASSIUM CHLORIDE, SODIUM LACTATE AND CALCIUM CHLORIDE 500 ML: 600; 310; 30; 20 INJECTION, SOLUTION INTRAVENOUS at 11:24

## 2023-05-11 RX ADMIN — PENICILLIN G POTASSIUM 5 MILLION UNITS: 5000000 POWDER, FOR SOLUTION INTRAMUSCULAR; INTRAPLEURAL; INTRATHECAL; INTRAVENOUS at 11:55

## 2023-05-11 RX ADMIN — Medication 2 MILLI-UNITS/MIN: at 12:13

## 2023-05-11 RX ADMIN — DOCUSATE SODIUM 100 MG: 100 CAPSULE, LIQUID FILLED ORAL at 18:27

## 2023-05-11 RX ADMIN — FERROUS SULFATE TAB 325 MG (65 MG ELEMENTAL FE) 325 MG: 325 (65 FE) TAB at 18:27

## 2023-05-11 RX ADMIN — KETOROLAC TROMETHAMINE 30 MG: 30 INJECTION, SOLUTION INTRAMUSCULAR; INTRAVENOUS at 16:49

## 2023-05-11 RX ADMIN — PENICILLIN G 3 MILLION UNITS: 3000000 INJECTION, SOLUTION INTRAVENOUS at 15:20

## 2023-05-11 RX ADMIN — ACETAMINOPHEN 650 MG: 325 TABLET, FILM COATED ORAL at 22:04

## 2023-05-11 ASSESSMENT — ACTIVITIES OF DAILY LIVING (ADL)
ADLS_ACUITY_SCORE: 18

## 2023-05-11 NOTE — PROGRESS NOTES
500cc fluid flush completed. Patient continues to rate pain 3/10 and is considerately feeling all of her contractions. Hi score of 5.  Dr. Schuamcher notified. Plan is to start low dose pitocin once Resident connects with Dr. Rivera

## 2023-05-11 NOTE — PROGRESS NOTES
PROVIDER NOTIFICATION:    Dr. Schumacher notified of SROM at 1410 for large amount of clear fluid.

## 2023-05-11 NOTE — PROGRESS NOTES
Labor Progress Note    Assessment/Plan  28 year old  at 39w1d gestational age admitted for IOL due to concern for macrosomia. History of shoulder dystocia at 40 weeks with last delivery. S/p cervadil overnight. Now on pitocin at 6. SROM with large amount of clear fluid at 1410. Contractions are much stronger now. Cervix 4-5cm/80/-2.    - Continue to monitor FHR  - continue pitocin  - continue penicillin for GBS  - Anticipate     Subjective  Contractions are more intense, now breathing through them. Feeling the pain in the front.      Objective  Vital signs in last 24 hours  Temp:  [97.5  F (36.4  C)-98.6  F (37  C)] 98.6  F (37  C)  Pulse:  [65-82] 75  Resp:  [16-18] 16  BP: ()/(53-58) 117/57  SpO2:  [97 %-98 %] 98 %      Physical Exam  General:   Abd: Gravid, soft, nontender  Cervix: 4-5cm/80/-2  FHR: Baseline 140/moderate variability/present accels/absent decels; Category 1 tracing  [place]: Contractions Q 1-3 min, coupling  Extremities: no peripheral edema    Pertinent Labs   Lab Results   Component Value Date    ABORH A POS 2023    HGB 9.4 2023        Patient discussed with attending physician, Dr. Alaina Rivera , who agrees with the plan.     Sharmin Schumacher MD PGY3 2023  [facility] Residency Program

## 2023-05-11 NOTE — L&D DELIVERY NOTE
OB Vaginal Delivery Note    Elsi Sheth MRN# [id number]   Age: 28 year old YOB: 1995       GA: 39w1d  GP:   Labor Complications: None   EBL:   200 mL  Delivery QBL:  Large amount of fluid with delivery that was not measured, so unable to calculate QBL  Delivery Type: Vaginal, Spontaneous   ROM to Delivery Time: (Delivered) Hours: 1 Minutes: 49  [place] Weight: 3.771 kg (8 lb 5 oz)    1 Minute 5 Minute 10 Minute   Apgar Totals: 7   8        MELONIE GAMINO;MEENA FERNÁNDEZ;MONICA MORENO;POLLY ARORA     Delivery Details:  Elsi Sheth, a 28 year old  female delivered a viable infant with apgars of 7  and 8 . Patient was fully dilated and pushing after about 3 hours in active labor. Rapidly progressed from 4 to 10 cm in 30 min. Delivery was via vaginal, spontaneous  to a sterile field under none  anesthesia. Infant delivered in vertex  left  occiput  anterior  position. A loose nuchal cord was present. While attempting to reduce the nuchal cord, the cord avulsed completely. Anterior and posterior shoulders delivered without difficulty. The umbilical cord was clamped immediately when the baby was placed on the mother's abdomen, about 30 seconds after cord avulsed. Baby was then taken to the warmer for closer evaluation. Apgars were 7 and 8.  3 vessels  were noted in the umbilical cord. Cord blood was not collected.     Cord complications: nuchal with complete avulsion while attempting to reduce the cord  Placenta delivered at 2023  4:14 PM . Placental disposition was Hospital disposal . Fundal massage performed and fundus found to be firm.     Episiotomy: none    Perineum, vagina, cervix were inspected, and the following lacerations were noted:   Perineal lacerations: 1st  degree, no vaginal component, repaired with subcuticular stitch.              Any lacerations were repaired in the usual fashion using 3-0 chromic.    Excellent hemostasis was noted. Needle count  correct. Infant and patient in delivery room in good and stable condition.     Dr. Gunter was present for the delivery and repair. Dr. Rivera updated upon arrival.          Boubacar Sheth-Elsi [[id number]]    Labor Events     labor?: No   steroids: None  Labor Type: Induction/Cervical ripening  Predominate monitoring during 1st stage: continuous electronic fetal monitoring     Antibiotics received during labor?: Yes  Reason for Antibiotics: GBS  Antibiotics received for GBS: Penicillin  Antibiotics Given (GBS): Greater than 4 hours prior to delivery     Rupture identifier: Sac 1  Rupture date/time: 23 1410   Rupture type: Spontaneous Rupture of Membranes  Fluid color: Clear     Induction: Cervidil  Induction date/time:      Cervical ripening date/time: 5/10/23 2135    Indications for induction: Other Pregnant Patient Indications (Comment to specify)     Augmentation: Oxytocin  Indications for augmentation: Ineffective Contraction Pattern     Delivery/Placenta Date and Time    Delivery Date: 23 Delivery Time:  3:59 PM   Placenta Date/Time: 2023  4:14 PM  Oxytocin given at the time of delivery: after delivery of baby  Delivering clinician: Raine Gunter MD   Other personnel present at delivery:  Provider Role   Sharmin Schumacher MD Resident   Charlotte Mchugh RN Registered Nurse   Christa Hitchcock RN Registered Nurse   Sarah Aguirre RN Registered Nurse         Vaginal Counts     Initial count performed by 2 team members:  Two Team Members   Dr. Endy Hitchcock       Needles Suture Needles Sponges (RETIRED) Instruments   Initial counts 0 0 0    Added to count 1 1 0    Relief counts       Final counts 1 1 0          Placed during labor Accounted for at the end of labor   FSE No NA   IUPC No NA   Cervidil Yes Yes              Final count performed by 2 team members:  Two Team Members   Dr. Endy Hitchcock      Final count correct?: Yes  Pre-Birth Team Brief:  "Complete  Post-Birth Team Debrief: Complete     Apgars    Living status: Living   1 Minute 5 Minute 10 Minute 15 Minute 20 Minute   Skin color: 0  0       Heart rate: 2  2       Reflex irritability: 2  2       Muscle tone: 1  2       Respiratory effort: 2  2       Total: 7  8       Apgars assigned by: ETHEL MORENO     Cord    Vessels: 3 Vessels    Cord Complications: Nuchal   Nuchal Intervention: reduced   Nuchal cord description: loose nuchal cord   Cord Blood Disposition: Discard      Gases Sent?: No      Delayed cord clamping?: No      Stem cell collection?: No                      Resuscitation    Methods: None  Output in Delivery Room: Voided, Stool     [place] Measurements    Weight: 8 lb 5 oz Length: 1' 9\"   Head circumference: 35.5 cm    Output in delivery room: Voided, Stool     Skin to Skin and Feeding Plan    Skin to skin initiation date/time: 1841    Skin to skin with: Mother  Skin to skin end date/time:        Labor Events and Shoulder Dystocia    Fetal Tracing Prior to Delivery: Category 2  Shoulder dystocia present?: Neg     Delivery (Maternal) (Provider to Complete) ([id number])    Episiotomy: None  Perineal lacerations: 1st Repaired?: Yes   Repair suture: 3-0 Chromic  Genital tract inspection done: Pos     Blood Loss  Mother: Elsi Sheth #[id number]   Start of Mother's Information    Delivery Blood Loss  23 0359 - 23 1643    None           End of Mother's Information  Mother: Elsi Sheth #[id number]          Delivery - Provider to Complete ([id number])    Delivering clinician: Raine Gunter MD  Delivery Type (Choose the 1 that will go to the Birth History): Vaginal, Spontaneous    Other personnel:  Provider Role   Sharmin Schumacher MD Resident   Charlotte Mchugh RN Registered Nurse   Christa Moreno RN Registered Nurse   Sarah Aguirre RN Registered Nurse                               Placenta    Date/Time: 2023  4:14 PM  Removal: Spontaneous  Comments: cord " avulsed during reduction  Disposition: Hospital disposal           Anesthesia    Method: None                Presentation and Position    Presentation: Vertex    Position: Left Occiput Anterior                 Sharmin Schumacher MD

## 2023-05-11 NOTE — PROGRESS NOTES
PROVIDER NOTIFICATION:    Dr. Rivera updated on patient's status. SVE unchanged. Cervidil removed. Continuing to use the bakari- sensitivity decreased. Contractions still irregular. Patient not feeling all of them and rates them a 1/10. States she feels them primarily in front. OK per Dr. Rivera for patient to be off the monitor to shower. Patient to start PO cytotec 25mcg q 2hrs and GBS prophylaxis once done showering. Dr. Rivera in clinic today 20 minutes away.

## 2023-05-11 NOTE — H&P
OBSTETRICS ADMISSION HISTORY & PHYSICAL  DATE OF ADMISSION: 5/10/2023  7:44 PM        Assessment and Plan:   Assessment:  Elsi Sheth is a 28 year old  at 39w1d admitted for IOL due to concern for macrosomia. History of shoulder dystocia at 40 weeks with last delivery. IOL with cervical ripening cleared by OB chair per prenatal records. US  measuring growth at 1 weeks ahead per prenatal record. S/p cervadil overnight. Cervix unchanged, 2/50/-3/posterior. Contractions mild and irregular  Patient Active Problem List   Diagnosis     Normal spontaneous vaginal delivery     Pregnancy        PLAN:   1. Admit - see IP orders  2. IOL. Continue cervical ripening cytotec  3. GBS: positive. Will start antibiotics now with cytotec  4. CBC, type and screen due to history of shoulder dystocia  5. Comfort plan: undecided   6. Will monitor labor progress along with RN and update attending physician    Patient discussed with attending physician, Dr. Alaina Rivera , who agrees with the plan.     Sharmin Schumacher MD PGY-3,  2023  [facility] Medicine Residency Program         Chief Complaint:     IOL       History of Present Illness:     Elsi Sheth is a 28 year old year old  at 39w1d confirmed by LMP c/w early US. Patient received prenatal care with Brianna Duckworth at [facility].      Presents to the [facility] for induction of labor, indication suspected macrosomia.    They report mild irregular contractions. She reports no fluid leakage or vaginal bleeding. Fetal movement is normal.    Their prenatal course has been complicated by hx shoulder dystocia at 40 wks with last pregnancy, obesity, anxiety, GBS positive.    Prenatal labs   Lab Results   Component Value Date    AS Negative 2022    HEPBANG Nonreactive 2022    CHPCRT Negative 2023    GCPCRT Negative 2021    HGB 11.4 (L) 2022    GBS Positive (A) 2023       GBS  was collected on 23.  Weight gain during pregnancy: 8 lb/Not found.         Obstetrical History:     OB History    Para Term  AB Living   3 2 2 0 0 2   SAB IAB Ectopic Multiple Live Births   0 0 0 0 2      # Outcome Date GA Lbr Saeid/2nd Weight Sex Delivery Anes PTL Lv   3 Current            2 Term 18 40w1d 05:08 / 01:35 4.394 kg (9 lb 11 oz) M Vag-Spont Nitrous N WILL      Name: DELMY BARRERA-GEENA      Apgar1: 3  Apgar5: 8   1 Term 12/15/14 39w5d 10:11  02:40 3.374 kg (7 lb 7 oz) M Vag-Spont Local, EPI N WILL      Name: DELMY BARRERA-GEENA      Apgar1: 8  Apgar5: 9              Immunzations:     Most Recent Immunizations   Administered Date(s) Administered     Flu, Unspecified 2018     TDAP (Adacel,Boostrix) 10/09/2014     Tdap this pregnancy?  YES - Date: 3/31/23  Flu shot this pregnancy?YES - Date: 22  COVID vaccine? YES - Date: ,          Past Medical History:   No past medical history on file.   Obesity  Anxiety  Migraines without aura         Past Surgical History:   No past surgical history on file.   No pertinent surgical hx         Family History:   No family history on file.   No pertinent family history         Social History:   no tobacco use  no alcohol use  no illicit drug use         Medications:   No current facility-administered medications on file prior to encounter.  docusate sodium (COLACE) 100 MG capsule, [DOCUSATE SODIUM (COLACE) 100 MG CAPSULE] Take 1 capsule (100 mg total) by mouth daily.  ferrous sulfate 325 (65 FE) MG tablet, [FERROUS SULFATE 325 (65 FE) MG TABLET] Take 1 tablet by mouth daily with breakfast.  ibuprofen (ADVIL,MOTRIN) 800 MG tablet, [IBUPROFEN (ADVIL,MOTRIN) 800 MG TABLET] Take 1 tablet (800 mg total) by mouth every 8 (eight) hours.  prenatal vitamin iron-folic acid 27mg-0.8mg (PRENATAL S) 27 mg iron- 800 mcg Tab tablet, [PRENATAL VITAMIN IRON-FOLIC ACID 27MG-0.8MG (PRENATAL S) 27 MG IRON- 800 MCG TAB TABLET] Take 1 tablet by mouth  "daily.             Allergies:   Sulfa (sulfonamide antibiotics) [sulfa antibiotics]         Review of Systems:   Complete ROS negative other than listed above.         Physical Exam:   Vitals:   /54 (BP Location: Right arm, Patient Position: Right side, Cuff Size: Adult Regular)   Pulse 75   Temp 98.1  F (36.7  C) (Oral)   Resp 16   Ht 1.549 m (5' 1\")   Wt 85.3 kg (188 lb)   SpO2 98%   BMI 35.52 kg/m    188 lbs 0 oz  Estimated body mass index is 35.52 kg/m  as calculated from the following:    Height as of this encounter: 1.549 m (5' 1\").    Weight as of this encounter: 85.3 kg (188 lb).    GEN: Awake, alert in no apparent distress   HEENT: grossly normal  NECK: supple  RESPIRATORY: no increased work of breathing  CARDIOVASCULAR: RRR  ABDOMEN: gravid  PELVIC:  no fluid noted, no blood noted.  Presenting part: cephalic per RN.  Cervix: 2/50/-3 per RN  EXT:  no edema or calf tenderness  Confirmed VTX by Ultrasound? No, sutures palpated per RN    Electronic Fetal Monitoring:  Baseline rate normal  Variability moderate  Accelerations present  Decelerations not currently present, did have 2 decelerations to 90 bpm this morning    Assessment: Category I EFM interpretation suggests absence of concern for fetal metabolic acidemia at this time due to accelerations present, decelerations absent and variability: moderate    Uterine Activity irregular, contractions 2-4 min      Strip reviewed on unit    "

## 2023-05-11 NOTE — PROGRESS NOTES
PROVIDER NOTIFICATION:    Dr. Schumacher updated on patient status;    Patient off monitor for awhile with showering. Patient more uncomfortable and feeling all contractions since last SVE and shower.  RN started IV and currently giving 500cc LR bolus to determine if contractions will space out and can give cytotec or if they continue and should consider change in plan.

## 2023-05-11 NOTE — PROGRESS NOTES
Labor Progress Note    Assessment/Plan  28 year old  at 39w1d gestational age admitted for IOL due to concern for macrosomia. History of shoulder dystocia at 40 weeks with last delivery. S/p cervadil overnight. Cervix unchanged after cervadil removal, 50/-3/posterior. Plan had been for cytotec, but contractions are more uncomfortable and regular. Hi 5.     - Continue to monitor FHR  - start pitocin  - continue penicillin for GBS  - Anticipate     Subjective  Feeling more uncomfortable with contractions.       Objective  Vital signs in last 24 hours  Temp:  [97.5  F (36.4  C)-98.4  F (36.9  C)] 97.5  F (36.4  C)  Pulse:  [65-82] 75  Resp:  [16-18] 16  BP: ()/(53-58) 117/57  SpO2:  [97 %-98 %] 98 %      Physical Exam  General:   Abd: Gravid, soft, nontender  Cervix: 2cm, 50% effaced, -3 station, Hi of 5  FHR: Baseline 140/moderate variability/present accels/absent decels; Category 1 tracing  [place]: Contractions Q 1-3 min, coupling  Extremities: no peripheral edema    Pertinent Labs   Lab Results   Component Value Date    ABORH A POS 2023    HGB 9.4 2023        Patient discussed with attending physician, Dr. Alaina Rivera , who agrees with the plan.     Sharmin Schumacher MD PGY3 2023  [facility] Family Medicine Residency Program

## 2023-05-12 VITALS
BODY MASS INDEX: 33.08 KG/M2 | HEIGHT: 61 IN | HEART RATE: 67 BPM | DIASTOLIC BLOOD PRESSURE: 62 MMHG | RESPIRATION RATE: 17 BRPM | TEMPERATURE: 98 F | WEIGHT: 175.2 LBS | OXYGEN SATURATION: 98 % | SYSTOLIC BLOOD PRESSURE: 108 MMHG

## 2023-05-12 PROCEDURE — 250N000013 HC RX MED GY IP 250 OP 250 PS 637: Performed by: FAMILY MEDICINE

## 2023-05-12 RX ADMIN — DOCUSATE SODIUM 100 MG: 100 CAPSULE, LIQUID FILLED ORAL at 08:51

## 2023-05-12 RX ADMIN — FERROUS SULFATE TAB 325 MG (65 MG ELEMENTAL FE) 325 MG: 325 (65 FE) TAB at 18:47

## 2023-05-12 RX ADMIN — IBUPROFEN 800 MG: 800 TABLET ORAL at 16:51

## 2023-05-12 RX ADMIN — IBUPROFEN 800 MG: 800 TABLET ORAL at 06:56

## 2023-05-12 ASSESSMENT — ACTIVITIES OF DAILY LIVING (ADL)
ADLS_ACUITY_SCORE: 18

## 2023-05-12 NOTE — DISCHARGE INSTRUCTIONS
Warning Signs after Having a Baby    Keep this paper on your fridge or somewhere else where you can see it.    Call your provider if you have any of these symptoms up to 12 weeks after having your baby.    Thoughts of hurting yourself or your baby  Pain in your chest or trouble breathing  Severe headache not helped by pain medicine  Eyesight concerns (blurry vision, seeing spots or flashes of light, other changes to eyesight)  Fainting, shaking or other signs of a seizure    Call 9-1-1 if you feel that it is an emergency.     The symptoms below can happen to anyone after giving birth. They can be very serious. Call your provider if you have any of these warning signs.    My provider s phone number: _______________________    Losing too much blood (hemorrhage)    Call your provider if you soak through a pad in less than an hour or pass blood clots bigger than a golf ball. These may be signs that you are bleeding too much.    Blood clots in the legs or lungs    After you give birth, your body naturally clots its blood to help prevent blood loss. Sometimes this increased clotting can happen in other areas of the body, like the legs or lungs. This can block your blood flow and be very dangerous.     Call your provider if you:  Have a red, swollen spot on the back of your leg that is warm or painful when you touch it.   Are coughing up blood.     Infection    Call your provider if you have any of these symptoms:  Fever of 100.4 F (38 C) or higher.  Pain or redness around your stitches if you had an incision.   Any yellow, white, or green fluid coming from places where you had stitches or surgery.    Mood Problems (postpartum depression)    Many people feel sad or have mood changes after having a baby. But for some people, these mood swings are worse.     Call your provider right away if you feel so anxious or nervous that you can't care for yourself or your baby.    Preeclampsia (high blood pressure)    Even if you  didn't have high blood pressure when you were pregnant, you are at risk for the high blood pressure disease called preeclampsia. This risk can last up to 12 weeks after giving birth.     Call your provider if you have:   Pain on your right side under your rib cage  Sudden swelling in the hands and face    Remember: You know your body. If something doesn't feel right, get medical help.     For informational purposes only. Not to replace the advice of your health care provider. Copyright 2020 [facility]. All rights reserved. Clinically reviewed by Crystal Redyd, RNC-OB, MSN. [employer] [id number] - Rev 02/23.

## 2023-05-12 NOTE — PLAN OF CARE
Problem: Plan of Care - These are the overarching goals to be used throughout the patient stay.    Goal: Optimal Comfort and Wellbeing  Intervention: Provide Person-Centered Care  Recent Flowsheet Documentation  Taken 5/12/2023 0209 by Nneka Hammer RN  Trust Relationship/Rapport:   care explained   choices provided   emotional support provided   empathic listening provided   questions answered   questions encouraged   reassurance provided   thoughts/feelings acknowledged     Problem: Postpartum (Vaginal Delivery)  Goal: Optimal Pain Control and Function  Outcome: Progressing     VSS. Pain managed with Tylenol overnight. Fundus firm, +2. Minimal bleeding. Denies dizziness and visual changes. Up ad nahomy. Voids spontaneously. Pt taught how to do hand expression. Pt receptive to feedback. Bonding well with baby. Will continue to support and monitor.

## 2023-05-12 NOTE — PLAN OF CARE
"Patient is alert and oriented. She is up independently in room, voiding spontaneously. Pain is well controlled on oral medication. She is bonding well with . Breastfeeding. Fundus firm, lochia scant. VSS.       /62 (BP Location: Left arm)   Pulse 67   Temp 98  F (36.7  C) (Oral)   Resp 17   Ht 1.549 m (5' 1\")   Wt 85.3 kg (188 lb)   SpO2 98%   Breastfeeding Unknown   BMI 35.52 kg/m      "

## 2023-05-12 NOTE — DISCHARGE SUMMARY
Maternal Discharge Summary  [facility]  Date of Service: 2023    Name      Elsi Sheth         1995  MRN       [id number]  PCP        Brianna Duckworth    Admit Date:  5/10/2023  Discharge Date:  2023    Principal Diagnosis:    Patient Active Problem List   Diagnosis     Normal spontaneous vaginal delivery     Pregnancy       Delivery Type: vaginal, spontaneous     Hospital Course:  Elsi Sheth is a 28 year old now  s/p vaginal, spontaneous  at 39w1d on 23.  The patient's hospital course was unremarkable.  She recovered as anticipated and experienced no post-delivery complications.  On discharge, her pain was well controlled.  Vaginal bleeding is normal.  Voiding without difficulty.  Ambulating well and tolerating a normal diet.  No fevers.       Conditions complicating Pregnancy:  Other Complications/Significant Findings:  none    Procedure(s) Performed: none    Discharge Plan:   1. Discharge to Home. Condition at Discharge:  stable  2. Physical activity: Regular.  3. Diet:  Regular.  4. Home care nurse: declined by patient.  5. Contraception plan: undecided, will follow up at postpartum visit.  6. Follow up with Brianan Ruiz in 6 weeks.    Discharge Medications:   Current Discharge Medication List      CONTINUE these medications which have NOT CHANGED    Details   docusate sodium (COLACE) 100 MG capsule [DOCUSATE SODIUM (COLACE) 100 MG CAPSULE] Take 1 capsule (100 mg total) by mouth daily.  Qty: 10 capsule, Refills: 0    Associated Diagnoses: Normal spontaneous vaginal delivery      ferrous sulfate 325 (65 FE) MG tablet [FERROUS SULFATE 325 (65 FE) MG TABLET] Take 1 tablet by mouth daily with breakfast.      ibuprofen (ADVIL,MOTRIN) 800 MG tablet [IBUPROFEN (ADVIL,MOTRIN) 800 MG TABLET] Take 1 tablet (800 mg total) by mouth every 8 (eight) hours.  Qty: 30 tablet, Refills: 0    Associated Diagnoses:  "Normal spontaneous vaginal delivery      prenatal vitamin iron-folic acid 27mg-0.8mg (PRENATAL S) 27 mg iron- 800 mcg Tab tablet [PRENATAL VITAMIN IRON-FOLIC ACID 27MG-0.8MG (PRENATAL S) 27 MG IRON- 800 MCG TAB TABLET] Take 1 tablet by mouth daily.             Allergies:   Allergies   Allergen Reactions     Sulfa (Sulfonamide Antibiotics) [Sulfa Antibiotics] Rash       Discharge Exam:  /62 (BP Location: Left arm)   Pulse 67   Temp 98  F (36.7  C) (Oral)   Resp 17   Ht 1.549 m (5' 1\")   Wt 85.3 kg (188 lb)   SpO2 98%   Breastfeeding Unknown   BMI 35.52 kg/m    General - alert, comfortable  Heart - RRR, no murmurs  Lungs - CTA bilaterally  Abdomen - fundus firm, nontender, below umbilicus  Extremities - trace edema    Post-partum hemoglobin:   Hemoglobin   Date Value Ref Range Status   05/11/2023 9.4 (L) 11.7 - 15.7 g/dL Final       Greater than 30 minutes were spent on this discharge on the day of service.      Completed by:   Jenni Rivera MD  [facility], [place]  5/12/2023 10:12 AM  "

## 2023-05-12 NOTE — OP NOTE
Procedure Date: 2023    HISTORY:  The patient is routinely followed by Dr. Jenni Rivera.  I was called for delivery when she was unavailable. Delivery note is in the record, dictated and completed by the resident, Dr. Sharmin Schumacher. The patient was complete and pushing when I entered the room.  Fetal heart tones were in the 80s-90s, but good [id number] and once I was in the room, with 1 push, she delivered over an intact perineum with Apgars of 7 and 9.  There was a nuchal cord, which was reduced by Dr. Schumacher.  In so doing, it did separate, but baby was delivered right after and cord was clamped and because of the cord, baby was taken to the warmer with no delayed clamping.  Please see her delivery note. There was a first-degree perineal laceration which I observed and watched her suture with 3-0 chromic with good reapproximation.  Placenta delivered spontaneously.  EBL was 200 mL.      Raine Gunter MD        D: 2023   T: 2023   MT: alli    Name:     GEENA BARRERA  MRN:      [id number]        Account:        [id number]   :      1995           Procedure Date: 2023     Document: [id number]    cc:  Leroy Werner

## 2023-05-13 NOTE — PROGRESS NOTES
Patient discharged to home. All education and discharge AVS reviewed with patient. Patient ambulating independent with VSS. Patient walked out with hospital staff.

## 2023-05-30 ENCOUNTER — LAB REQUISITION (OUTPATIENT)
Dept: LAB | Facility: CLINIC | Age: 28
End: 2023-05-30

## 2023-05-30 PROCEDURE — 84443 ASSAY THYROID STIM HORMONE: CPT | Performed by: FAMILY MEDICINE

## 2023-05-31 LAB — TSH SERPL DL<=0.005 MIU/L-ACNC: 0.75 UIU/ML (ref 0.3–4.2)

## 2023-06-04 ENCOUNTER — HEALTH MAINTENANCE LETTER (OUTPATIENT)
Age: 28
End: 2023-06-04

## 2024-07-21 ENCOUNTER — HEALTH MAINTENANCE LETTER (OUTPATIENT)
Age: 29
End: 2024-07-21

## 2024-08-23 ENCOUNTER — LAB REQUISITION (OUTPATIENT)
Dept: LAB | Facility: CLINIC | Age: 29
End: 2024-08-23

## 2024-08-23 DIAGNOSIS — Z13.1 ENCOUNTER FOR SCREENING FOR DIABETES MELLITUS: ICD-10-CM

## 2024-08-23 DIAGNOSIS — Z13.6 ENCOUNTER FOR SCREENING FOR CARDIOVASCULAR DISORDERS: ICD-10-CM

## 2024-08-23 DIAGNOSIS — Z11.3 ENCOUNTER FOR SCREENING FOR INFECTIONS WITH A PREDOMINANTLY SEXUAL MODE OF TRANSMISSION: ICD-10-CM

## 2024-08-23 PROCEDURE — 86780 TREPONEMA PALLIDUM: CPT | Performed by: STUDENT IN AN ORGANIZED HEALTH CARE EDUCATION/TRAINING PROGRAM

## 2024-08-23 PROCEDURE — 87491 CHLMYD TRACH DNA AMP PROBE: CPT | Performed by: STUDENT IN AN ORGANIZED HEALTH CARE EDUCATION/TRAINING PROGRAM

## 2024-08-23 PROCEDURE — 86706 HEP B SURFACE ANTIBODY: CPT | Performed by: STUDENT IN AN ORGANIZED HEALTH CARE EDUCATION/TRAINING PROGRAM

## 2024-08-23 PROCEDURE — 87389 HIV-1 AG W/HIV-1&-2 AB AG IA: CPT | Performed by: STUDENT IN AN ORGANIZED HEALTH CARE EDUCATION/TRAINING PROGRAM

## 2024-08-23 PROCEDURE — 80048 BASIC METABOLIC PNL TOTAL CA: CPT | Performed by: STUDENT IN AN ORGANIZED HEALTH CARE EDUCATION/TRAINING PROGRAM

## 2024-08-23 PROCEDURE — 80061 LIPID PANEL: CPT | Performed by: STUDENT IN AN ORGANIZED HEALTH CARE EDUCATION/TRAINING PROGRAM

## 2024-08-23 PROCEDURE — 86803 HEPATITIS C AB TEST: CPT | Performed by: STUDENT IN AN ORGANIZED HEALTH CARE EDUCATION/TRAINING PROGRAM

## 2024-08-23 PROCEDURE — 87340 HEPATITIS B SURFACE AG IA: CPT | Performed by: STUDENT IN AN ORGANIZED HEALTH CARE EDUCATION/TRAINING PROGRAM

## 2024-08-24 LAB
ANION GAP SERPL CALCULATED.3IONS-SCNC: 13 MMOL/L (ref 7–15)
BUN SERPL-MCNC: 14 MG/DL (ref 6–20)
C TRACH DNA SPEC QL PROBE+SIG AMP: NEGATIVE
CALCIUM SERPL-MCNC: 9.8 MG/DL (ref 8.8–10.4)
CHLORIDE SERPL-SCNC: 105 MMOL/L (ref 98–107)
CHOLEST SERPL-MCNC: 201 MG/DL
CREAT SERPL-MCNC: 0.56 MG/DL (ref 0.51–0.95)
EGFRCR SERPLBLD CKD-EPI 2021: >90 ML/MIN/1.73M2
FASTING STATUS PATIENT QL REPORTED: ABNORMAL
FASTING STATUS PATIENT QL REPORTED: ABNORMAL
GLUCOSE SERPL-MCNC: 81 MG/DL (ref 70–99)
HBV SURFACE AB SERPL IA-ACNC: 7.99 M[IU]/ML
HBV SURFACE AB SERPL IA-ACNC: NONREACTIVE M[IU]/ML
HBV SURFACE AG SERPL QL IA: NONREACTIVE
HCO3 SERPL-SCNC: 21 MMOL/L (ref 22–29)
HCV AB SERPL QL IA: NONREACTIVE
HDLC SERPL-MCNC: 56 MG/DL
HIV 1+2 AB+HIV1 P24 AG SERPL QL IA: NONREACTIVE
LDLC SERPL CALC-MCNC: 130 MG/DL
N GONORRHOEA DNA SPEC QL NAA+PROBE: NEGATIVE
NONHDLC SERPL-MCNC: 145 MG/DL
POTASSIUM SERPL-SCNC: 4.2 MMOL/L (ref 3.4–5.3)
SODIUM SERPL-SCNC: 139 MMOL/L (ref 135–145)
T PALLIDUM AB SER QL: NONREACTIVE
TRIGL SERPL-MCNC: 77 MG/DL

## 2025-07-24 ENCOUNTER — PATIENT OUTREACH (OUTPATIENT)
Dept: CARE COORDINATION | Facility: CLINIC | Age: 30
End: 2025-07-24
Payer: COMMERCIAL